# Patient Record
Sex: FEMALE | Race: WHITE | NOT HISPANIC OR LATINO
[De-identification: names, ages, dates, MRNs, and addresses within clinical notes are randomized per-mention and may not be internally consistent; named-entity substitution may affect disease eponyms.]

---

## 2017-05-07 ENCOUNTER — HOSPITAL (OUTPATIENT)
Dept: OTHER | Age: 82
End: 2017-05-07
Attending: INTERNAL MEDICINE

## 2017-08-28 PROBLEM — M81.0 AGE-RELATED OSTEOPOROSIS WITHOUT CURRENT PATHOLOGICAL FRACTURE: Status: ACTIVE | Noted: 2017-08-28

## 2017-08-28 PROBLEM — F41.9 ANXIETY: Status: ACTIVE | Noted: 2017-08-28

## 2017-08-28 PROBLEM — E78.5 HYPERLIPIDEMIA LDL GOAL <130: Status: ACTIVE | Noted: 2017-08-28

## 2017-08-28 PROBLEM — I10 ESSENTIAL HYPERTENSION: Status: ACTIVE | Noted: 2017-08-28

## 2017-08-28 PROBLEM — E03.9 HYPOTHYROIDISM, UNSPECIFIED TYPE: Status: ACTIVE | Noted: 2017-08-28

## 2018-07-21 ENCOUNTER — APPOINTMENT (OUTPATIENT)
Dept: CV DIAGNOSTICS | Facility: HOSPITAL | Age: 83
DRG: 309 | End: 2018-07-21
Attending: NURSE PRACTITIONER
Payer: MEDICARE

## 2018-07-21 ENCOUNTER — APPOINTMENT (OUTPATIENT)
Dept: CT IMAGING | Facility: HOSPITAL | Age: 83
DRG: 309 | End: 2018-07-21
Attending: HOSPITALIST
Payer: MEDICARE

## 2018-07-21 ENCOUNTER — APPOINTMENT (OUTPATIENT)
Dept: GENERAL RADIOLOGY | Facility: HOSPITAL | Age: 83
DRG: 309 | End: 2018-07-21
Attending: EMERGENCY MEDICINE
Payer: MEDICARE

## 2018-07-21 ENCOUNTER — HOSPITAL ENCOUNTER (INPATIENT)
Facility: HOSPITAL | Age: 83
LOS: 2 days | Discharge: HOME | DRG: 309 | End: 2018-07-23
Attending: EMERGENCY MEDICINE | Admitting: HOSPITALIST
Payer: MEDICARE

## 2018-07-21 DIAGNOSIS — S01.01XA LACERATION OF SCALP, INITIAL ENCOUNTER: ICD-10-CM
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
## 2018-07-21 DIAGNOSIS — R00.1 BRADYCARDIA: Primary | ICD-10-CM

## 2018-07-21 LAB
ALBUMIN SERPL-MCNC: 3.2 G/DL (ref 3.5–4.8)
ALBUMIN/GLOB SERPL: 0.9 {RATIO} (ref 1–2)
ALP LIVER SERPL-CCNC: 77 U/L (ref 55–142)
ALT SERPL-CCNC: 20 U/L (ref 14–54)
ANION GAP SERPL CALC-SCNC: 9 MMOL/L (ref 0–18)
AST SERPL-CCNC: 19 U/L (ref 15–41)
ATRIAL RATE: 40 BPM
BASOPHILS # BLD AUTO: 0.05 X10(3) UL (ref 0–0.1)
BASOPHILS NFR BLD AUTO: 0.5 %
BILIRUB SERPL-MCNC: 0.6 MG/DL (ref 0.1–2)
BUN BLD-MCNC: 52 MG/DL (ref 8–20)
BUN/CREAT SERPL: 34.7 (ref 10–20)
CALCIUM BLD-MCNC: 9 MG/DL (ref 8.3–10.3)
CHLORIDE SERPL-SCNC: 114 MMOL/L (ref 101–111)
CO2 SERPL-SCNC: 21 MMOL/L (ref 22–32)
CREAT BLD-MCNC: 1.5 MG/DL (ref 0.55–1.02)
EOSINOPHIL # BLD AUTO: 0.12 X10(3) UL (ref 0–0.3)
EOSINOPHIL NFR BLD AUTO: 1.2 %
ERYTHROCYTE [DISTWIDTH] IN BLOOD BY AUTOMATED COUNT: 13.5 % (ref 11.5–16)
GLOBULIN PLAS-MCNC: 3.6 G/DL (ref 2.5–3.7)
GLUCOSE BLD-MCNC: 101 MG/DL (ref 70–99)
HCT VFR BLD AUTO: 42.9 % (ref 34–50)
HGB BLD-MCNC: 13.7 G/DL (ref 12–16)
IMMATURE GRANULOCYTE COUNT: 0.04 X10(3) UL (ref 0–1)
IMMATURE GRANULOCYTE RATIO %: 0.4 %
LYMPHOCYTES # BLD AUTO: 0.86 X10(3) UL (ref 0.9–4)
LYMPHOCYTES NFR BLD AUTO: 8.3 %
M PROTEIN MFR SERPL ELPH: 6.8 G/DL (ref 6.1–8.3)
MCH RBC QN AUTO: 31.3 PG (ref 27–33.2)
MCHC RBC AUTO-ENTMCNC: 31.9 G/DL (ref 31–37)
MCV RBC AUTO: 97.9 FL (ref 81–100)
MONOCYTES # BLD AUTO: 0.53 X10(3) UL (ref 0.1–1)
MONOCYTES NFR BLD AUTO: 5.1 %
NEUTROPHIL ABS PRELIM: 8.7 X10 (3) UL (ref 1.3–6.7)
NEUTROPHILS # BLD AUTO: 8.7 X10(3) UL (ref 1.3–6.7)
NEUTROPHILS NFR BLD AUTO: 84.5 %
OSMOLALITY SERPL CALC.SUM OF ELEC: 312 MOSM/KG (ref 275–295)
P AXIS: 44 DEGREES
P-R INTERVAL: 226 MS
PLATELET # BLD AUTO: 226 10(3)UL (ref 150–450)
POTASSIUM SERPL-SCNC: 5.1 MMOL/L (ref 3.6–5.1)
Q-T INTERVAL: 506 MS
QRS DURATION: 82 MS
QTC CALCULATION (BEZET): 412 MS
R AXIS: 33 DEGREES
RBC # BLD AUTO: 4.38 X10(6)UL (ref 3.8–5.1)
RED CELL DISTRIBUTION WIDTH-SD: 48.4 FL (ref 35.1–46.3)
SODIUM SERPL-SCNC: 144 MMOL/L (ref 136–144)
T AXIS: 118 DEGREES
TROPONIN I SERPL-MCNC: <0.046 NG/ML (ref ?–0.05)
TSI SER-ACNC: 0.18 MIU/ML (ref 0.35–5.5)
VENTRICULAR RATE: 40 BPM
WBC # BLD AUTO: 10.3 X10(3) UL (ref 4–13)

## 2018-07-21 PROCEDURE — 71045 X-RAY EXAM CHEST 1 VIEW: CPT | Performed by: EMERGENCY MEDICINE

## 2018-07-21 PROCEDURE — 93306 TTE W/DOPPLER COMPLETE: CPT | Performed by: NURSE PRACTITIONER

## 2018-07-21 PROCEDURE — 84484 ASSAY OF TROPONIN QUANT: CPT | Performed by: EMERGENCY MEDICINE

## 2018-07-21 PROCEDURE — 36415 COLL VENOUS BLD VENIPUNCTURE: CPT

## 2018-07-21 PROCEDURE — 12002 RPR S/N/AX/GEN/TRNK2.6-7.5CM: CPT

## 2018-07-21 PROCEDURE — 93005 ELECTROCARDIOGRAM TRACING: CPT

## 2018-07-21 PROCEDURE — 99285 EMERGENCY DEPT VISIT HI MDM: CPT

## 2018-07-21 PROCEDURE — 93010 ELECTROCARDIOGRAM REPORT: CPT

## 2018-07-21 PROCEDURE — 70450 CT HEAD/BRAIN W/O DYE: CPT | Performed by: HOSPITALIST

## 2018-07-21 PROCEDURE — 80053 COMPREHEN METABOLIC PANEL: CPT | Performed by: EMERGENCY MEDICINE

## 2018-07-21 PROCEDURE — 0HQ0XZZ REPAIR SCALP SKIN, EXTERNAL APPROACH: ICD-10-PCS | Performed by: PATHOLOGY

## 2018-07-21 PROCEDURE — 85025 COMPLETE CBC W/AUTO DIFF WBC: CPT | Performed by: EMERGENCY MEDICINE

## 2018-07-21 PROCEDURE — 84443 ASSAY THYROID STIM HORMONE: CPT | Performed by: NURSE PRACTITIONER

## 2018-07-21 RX ORDER — POLYETHYLENE GLYCOL 3350 17 G/17G
17 POWDER, FOR SOLUTION ORAL DAILY PRN
Status: DISCONTINUED | OUTPATIENT
Start: 2018-07-21 | End: 2018-07-23

## 2018-07-21 RX ORDER — LATANOPROST 50 UG/ML
1 SOLUTION/ DROPS OPHTHALMIC NIGHTLY
Status: DISCONTINUED | OUTPATIENT
Start: 2018-07-21 | End: 2018-07-23

## 2018-07-21 RX ORDER — AZELASTINE 1 MG/ML
1 SPRAY, METERED NASAL 2 TIMES DAILY
Status: DISCONTINUED | OUTPATIENT
Start: 2018-07-21 | End: 2018-07-23

## 2018-07-21 RX ORDER — HYDRALAZINE HYDROCHLORIDE 20 MG/ML
5 INJECTION INTRAMUSCULAR; INTRAVENOUS EVERY 4 HOURS PRN
Status: DISCONTINUED | OUTPATIENT
Start: 2018-07-21 | End: 2018-07-23

## 2018-07-21 RX ORDER — ASPIRIN 81 MG/1
81 TABLET, CHEWABLE ORAL DAILY
Status: DISCONTINUED | OUTPATIENT
Start: 2018-07-22 | End: 2018-07-23

## 2018-07-21 RX ORDER — SODIUM CHLORIDE 9 MG/ML
INJECTION, SOLUTION INTRAVENOUS CONTINUOUS
Status: DISCONTINUED | OUTPATIENT
Start: 2018-07-21 | End: 2018-07-22

## 2018-07-21 RX ORDER — AZELASTINE 1 MG/ML
1 SPRAY, METERED NASAL 2 TIMES DAILY
Status: DISCONTINUED | OUTPATIENT
Start: 2018-07-21 | End: 2018-07-21 | Stop reason: RX

## 2018-07-21 RX ORDER — DOCUSATE SODIUM 100 MG/1
100 CAPSULE, LIQUID FILLED ORAL 2 TIMES DAILY
Status: DISCONTINUED | OUTPATIENT
Start: 2018-07-21 | End: 2018-07-23

## 2018-07-21 RX ORDER — METOCLOPRAMIDE HYDROCHLORIDE 5 MG/ML
5 INJECTION INTRAMUSCULAR; INTRAVENOUS EVERY 8 HOURS PRN
Status: DISCONTINUED | OUTPATIENT
Start: 2018-07-21 | End: 2018-07-23

## 2018-07-21 RX ORDER — BISACODYL 10 MG
10 SUPPOSITORY, RECTAL RECTAL
Status: DISCONTINUED | OUTPATIENT
Start: 2018-07-21 | End: 2018-07-23

## 2018-07-21 RX ORDER — ONDANSETRON 2 MG/ML
4 INJECTION INTRAMUSCULAR; INTRAVENOUS EVERY 6 HOURS PRN
Status: DISCONTINUED | OUTPATIENT
Start: 2018-07-21 | End: 2018-07-23

## 2018-07-21 RX ORDER — DORZOLAMIDE HCL 20 MG/ML
1 SOLUTION/ DROPS OPHTHALMIC 2 TIMES DAILY
Status: DISCONTINUED | OUTPATIENT
Start: 2018-07-21 | End: 2018-07-23

## 2018-07-21 RX ORDER — DONEPEZIL HYDROCHLORIDE 10 MG/1
10 TABLET, FILM COATED ORAL NIGHTLY
Status: DISCONTINUED | OUTPATIENT
Start: 2018-07-21 | End: 2018-07-23

## 2018-07-21 RX ORDER — PRAVASTATIN SODIUM 20 MG
20 TABLET ORAL NIGHTLY
Status: DISCONTINUED | OUTPATIENT
Start: 2018-07-21 | End: 2018-07-23

## 2018-07-21 NOTE — PROGRESS NOTES
NURSING ADMISSION NOTE      Patient admitted via Cart  Oriented to room. Safety precautions initiated. Bed in low position. Call light in reach.     Admission database completed  Patient up in chair  Daughter at bedside  Patient A/O x3-4  History of

## 2018-07-21 NOTE — PROGRESS NOTES
Attempted to do 2D echocardiogram at 4:15pm.  PCT in room with patient assesing vitals and caring patient. 2D echocardiogram to be done tomorrow morning.

## 2018-07-21 NOTE — ED PROVIDER NOTES
Patient Seen in: [facility] 3ne-a    History   Patient presents with:  Fall (musculoskeletal, neurologic)    Stated Complaint: fall    HPI    Patient was brought from assisted living facility after patient fell while in the dining room.   Patient nor noted within the wound. Eyes: EOM are normal.   Neck: Normal range of motion. Neck supple. Cardiovascular: Regular rhythm, normal heart sounds and intact distal pulses. Bradycardia present. No murmur heard.   Pulmonary/Chest: Effort normal and breat EKG    Rate, intervals and axes as noted on EKG Report. Rate: 40  Rhythm: Sinus Rhythm  Reading: Nonspecific ST and T-wave abnormalities.            ED Course as of Jul 21 1422  ------------------------------------------------------------  Laceration w

## 2018-07-21 NOTE — PROGRESS NOTES
Cardiology Consultation Note      Ruben Mixon Patient Status:  Inpatient    1929 MRN [id number]   [facility] 3NE-A Attending Mona Powers MD   [facility] Day # 0 PCP Geri Stock May, DO     Reason for Consultation:  -Fall    Impression:  1. weight 132 lb 4.4 oz (60 kg), SpO2 95 %.   Temp (24hrs), Av.7 °F (36.5 °C), Min:97.6 °F (36.4 °C), Max:97.7 °F (36.5 °C)    Wt Readings from Last 3 Encounters:  18 : 132 lb 4.4 oz (60 kg)  18 : 132 lb (59.9 kg)  18 : 129 lb 12.8 oz (58

## 2018-07-21 NOTE — ED INITIAL ASSESSMENT (HPI)
Pt fell while walking back from dining room, wound to back of head,  Pt does not remember falling.   Arrives alert to her norm,  Denies pain

## 2018-07-21 NOTE — PROGRESS NOTES
[facility] Pharmacy Note:  Renal Dose Adjustment for Metoclopramide (REGLAN)    Dara Awad has been prescribed Metoclopramide (REGLAN) 10 mg every 8 hours as needed for NV. Estimated Creatinine Clearance: 20.1 mL/min (A) (based on SCr of 1.5 mg/dL (H)).

## 2018-07-21 NOTE — PROGRESS NOTES
07/21/18 1627 07/21/18 1629 07/21/18 1631   Vitals   BP (!) 177/49 (!) 183/58 (!) 178/65   BP Location Right arm Right arm Right arm   BP Method Automatic Automatic Automatic   Patient Position Lying Sitting Standing     Negative orthostatic blood press

## 2018-07-22 LAB
ANION GAP SERPL CALC-SCNC: 7 MMOL/L (ref 0–18)
BASOPHILS # BLD AUTO: 0.07 X10(3) UL (ref 0–0.1)
BASOPHILS NFR BLD AUTO: 0.8 %
BUN BLD-MCNC: 46 MG/DL (ref 8–20)
BUN/CREAT SERPL: 41.1 (ref 10–20)
CALCIUM BLD-MCNC: 9 MG/DL (ref 8.3–10.3)
CHLORIDE SERPL-SCNC: 115 MMOL/L (ref 101–111)
CO2 SERPL-SCNC: 19 MMOL/L (ref 22–32)
CREAT BLD-MCNC: 1.12 MG/DL (ref 0.55–1.02)
EOSINOPHIL # BLD AUTO: 0.24 X10(3) UL (ref 0–0.3)
EOSINOPHIL NFR BLD AUTO: 2.7 %
ERYTHROCYTE [DISTWIDTH] IN BLOOD BY AUTOMATED COUNT: 13.5 % (ref 11.5–16)
GLUCOSE BLD-MCNC: 90 MG/DL (ref 70–99)
HCT VFR BLD AUTO: 40.2 % (ref 34–50)
HGB BLD-MCNC: 13.4 G/DL (ref 12–16)
IMMATURE GRANULOCYTE COUNT: 0.04 X10(3) UL (ref 0–1)
IMMATURE GRANULOCYTE RATIO %: 0.5 %
LYMPHOCYTES # BLD AUTO: 1.18 X10(3) UL (ref 0.9–4)
LYMPHOCYTES NFR BLD AUTO: 13.5 %
MCH RBC QN AUTO: 31.3 PG (ref 27–33.2)
MCHC RBC AUTO-ENTMCNC: 33.3 G/DL (ref 31–37)
MCV RBC AUTO: 93.9 FL (ref 81–100)
MONOCYTES # BLD AUTO: 0.74 X10(3) UL (ref 0.1–1)
MONOCYTES NFR BLD AUTO: 8.5 %
NEUTROPHIL ABS PRELIM: 6.48 X10 (3) UL (ref 1.3–6.7)
NEUTROPHILS # BLD AUTO: 6.48 X10(3) UL (ref 1.3–6.7)
NEUTROPHILS NFR BLD AUTO: 74 %
OSMOLALITY SERPL CALC.SUM OF ELEC: 303 MOSM/KG (ref 275–295)
PLATELET # BLD AUTO: 226 10(3)UL (ref 150–450)
POTASSIUM SERPL-SCNC: 4.3 MMOL/L (ref 3.6–5.1)
RBC # BLD AUTO: 4.28 X10(6)UL (ref 3.8–5.1)
RED CELL DISTRIBUTION WIDTH-SD: 46.8 FL (ref 35.1–46.3)
SODIUM SERPL-SCNC: 141 MMOL/L (ref 136–144)
WBC # BLD AUTO: 8.8 X10(3) UL (ref 4–13)

## 2018-07-22 PROCEDURE — 85025 COMPLETE CBC W/AUTO DIFF WBC: CPT | Performed by: HOSPITALIST

## 2018-07-22 PROCEDURE — 80048 BASIC METABOLIC PNL TOTAL CA: CPT | Performed by: HOSPITALIST

## 2018-07-22 RX ORDER — LEVOTHYROXINE SODIUM 0.15 MG/1
150 TABLET ORAL
Status: DISCONTINUED | OUTPATIENT
Start: 2018-07-22 | End: 2018-07-23

## 2018-07-22 RX ORDER — HEPARIN SODIUM 5000 [USP'U]/ML
5000 INJECTION, SOLUTION INTRAVENOUS; SUBCUTANEOUS EVERY 8 HOURS SCHEDULED
Status: DISCONTINUED | OUTPATIENT
Start: 2018-07-22 | End: 2018-07-23

## 2018-07-22 NOTE — PLAN OF CARE
Patient is A&Ox1-2  Denies any pain or discomfort  No n/v/d  SB on tele; on room air  Posterior head laceration with suture noted   Dressing changed  Up with a walker  Afebrile, VSS  IVF infusing  Will continue to monitor        CARDIOVASCULAR - ADULT    •

## 2018-07-22 NOTE — PROGRESS NOTES
[facility] Hospitalist Progress Note                                                                   [place] [address]  1/28/1929    Cc: f/u bradycardia    SUBJECTIVE:    Pt denies CP/SOB, N/V, LH/Di intracranial bleeding  -EKG with sinus bradycardia  -Orthostatics negative  -TTE  -Telemetry  -Cardiology consulted, appreciate  -Fall precautions  -PT/OT     Sinus bradycardia  -Holding BB  -Cardiology following     HTN  -holding BB as above  -PRN hydrala

## 2018-07-22 NOTE — PLAN OF CARE
CARDIOVASCULAR - ADULT    • Absence of cardiac arrhythmias or at baseline Progressing        HEMATOLOGIC - ADULT    • Maintains hematologic stability Progressing        MUSCULOSKELETAL - ADULT    • Return mobility to safest level of function Progressing

## 2018-07-22 NOTE — PROGRESS NOTES
Cardiology Consultation Note      Hailey Mercury Worst Patient Status:  Inpatient    1929 MRN [id number]   [facility] 3NE-A Attending Francisco Javier Pandya MD   [facility] Day # 1 PCP Adan Sykes DO   Outpatient cardiologist: None  Reason for initial co Medical History:   Diagnosis Date   • Anxiety    • Essential hypertension    • Glaucoma    • Hyperlipidemia    • Hypothyroid    • Mitral valve prolapse, nonrheumatic    • Osteoporosis        Family History   Problem Relation Age of Onset   • Cancer Mother Date   WBC 8.8 07/22/2018   HGB 13.4 07/22/2018   HCT 40.2 07/22/2018   .0 07/22/2018   CREATSERUM 1.12 07/22/2018   BUN 46 07/22/2018    07/22/2018   K 4.3 07/22/2018    07/22/2018   CO2 19.0 07/22/2018   GLU 90 07/22/2018   CA 9.0 07/2

## 2018-07-22 NOTE — H&P
[facility] Hospitalist History and Physical      Patient presents with:  Fall (musculoskeletal, neurologic)       PCP: Edson Sykes DO      History of Present Illness: Patient is a 80year old female with PMH sig for HTN, HL, Hypothyroid, glaucoma, anxiety who pr Lab Results  Component Value Date   WBC 10.3 07/21/2018   HGB 13.7 07/21/2018   HCT 42.9 07/21/2018   .0 07/21/2018   CREATSERUM 1.50 07/21/2018   BUN 52 07/21/2018    07/21/2018   K 5.1 07/21/2018    07/21/2018   CO2 21.0 07/21/2018 further evaluation. 3. Small contusion to the right occipital scalp.     Dictated by: Gary Au MD on 7/21/2018 at 18:08     Approved by: Gary Au MD            Xr Chest Ap Portable  (cpt=71045)    Result Date: 7/21/2018  PROCEDURE:  XR BARAK Certification    Patient will require inpatient services that will reasonably be expected to span two midnight's based on the clinical documentation in H+P.    Based on patients current state of illness, I anticipate that, after discharge, patient will requ

## 2018-07-23 VITALS
TEMPERATURE: 98 F | RESPIRATION RATE: 16 BRPM | WEIGHT: 132.25 LBS | OXYGEN SATURATION: 97 % | DIASTOLIC BLOOD PRESSURE: 68 MMHG | HEART RATE: 52 BPM | SYSTOLIC BLOOD PRESSURE: 181 MMHG | BODY MASS INDEX: 24 KG/M2

## 2018-07-23 LAB
ANION GAP SERPL CALC-SCNC: 9 MMOL/L (ref 0–18)
BUN BLD-MCNC: 36 MG/DL (ref 8–20)
BUN/CREAT SERPL: 30.8 (ref 10–20)
CALCIUM BLD-MCNC: 8.8 MG/DL (ref 8.3–10.3)
CHLORIDE SERPL-SCNC: 112 MMOL/L (ref 101–111)
CO2 SERPL-SCNC: 19 MMOL/L (ref 22–32)
CREAT BLD-MCNC: 1.17 MG/DL (ref 0.55–1.02)
GLUCOSE BLD-MCNC: 95 MG/DL (ref 70–99)
HAV IGM SER QL: 2 MG/DL (ref 1.8–2.5)
OSMOLALITY SERPL CALC.SUM OF ELEC: 298 MOSM/KG (ref 275–295)
POTASSIUM SERPL-SCNC: 4.1 MMOL/L (ref 3.6–5.1)
SODIUM SERPL-SCNC: 140 MMOL/L (ref 136–144)

## 2018-07-23 PROCEDURE — 80048 BASIC METABOLIC PNL TOTAL CA: CPT | Performed by: NURSE PRACTITIONER

## 2018-07-23 PROCEDURE — 97530 THERAPEUTIC ACTIVITIES: CPT

## 2018-07-23 PROCEDURE — 97166 OT EVAL MOD COMPLEX 45 MIN: CPT

## 2018-07-23 PROCEDURE — 83735 ASSAY OF MAGNESIUM: CPT | Performed by: NURSE PRACTITIONER

## 2018-07-23 PROCEDURE — 97161 PT EVAL LOW COMPLEX 20 MIN: CPT

## 2018-07-23 RX ORDER — LEVOTHYROXINE SODIUM 0.15 MG/1
150 TABLET ORAL
Qty: 30 TABLET | Refills: 0 | Status: SHIPPED | OUTPATIENT
Start: 2018-07-24

## 2018-07-23 RX ORDER — AMLODIPINE BESYLATE 5 MG/1
5 TABLET ORAL DAILY
Qty: 30 TABLET | Refills: 0 | Status: SHIPPED | OUTPATIENT
Start: 2018-07-24 | End: 2018-07-23

## 2018-07-23 RX ORDER — AMLODIPINE BESYLATE 5 MG/1
5 TABLET ORAL DAILY
Status: DISCONTINUED | OUTPATIENT
Start: 2018-07-23 | End: 2018-07-23

## 2018-07-23 RX ORDER — AMLODIPINE BESYLATE 5 MG/1
5 TABLET ORAL DAILY
Qty: 30 TABLET | Refills: 0 | Status: SHIPPED | OUTPATIENT
Start: 2018-07-24

## 2018-07-23 NOTE — HOME CARE LIAISON
TNL REC'D PHONE REFERRAL TO SEE PTNT AND OFFER HH. TNL MET WITH PTNT AND FAMILY TO DISCUSS HH. FAMILY ASKED THAT I WAIT TO HEAR FROM POA TO FINALIZE THE [address].  TNL LEFT BROCHURE AND MY CONTACT NUMBER FOR POA    THANKS   David Enrique

## 2018-07-23 NOTE — PHYSICAL THERAPY NOTE
PHYSICAL THERAPY EVALUATION - INPATIENT     Room Number: 3912/0315-S  Evaluation Date: 7/23/2018  Type of Evaluation: Initial  Physician Order: PT Eval and Treat    Presenting Problem: s/p fall, bradycardia  Reason for Therapy: Mobility Dysfunction a OF MOTION AND STRENGTH ASSESSMENT  Upper extremity ROM and strength are within functional limits see OT evaluation    Lower extremity ROM is within functional limits     Lower extremity strength is within functional limits Right Hip flexion  3+/5  Left Hip tested  Comment : above per FIM    Skilled Therapy Provided: Patient received in supine with family present. Transfers to EOB with supervision for safety. Patient sat at [facility] with supervision for safety.   Transfers to stand with cga for balance/safety gai 5x/week  Number of Visits to Meet Established Goals: 4      CURRENT GOALS    Goal #1 Patient is able to demonstrate transfers Sit to/from Stand at assistance level: supervision  To rw   Goal #3 Patient is able to ambulate 150 feet with assist device: walke

## 2018-07-23 NOTE — CM/SW NOTE
MSW spoke to lauren Nyla Emmanuel at [address] [place] [address].  She states she would like call for report.  [phone]-ask for Zan Tavares

## 2018-07-23 NOTE — PROGRESS NOTES
Dr. Deshawn Haney paged due to patient having elevated /68 with a heart rate of 52. Coreg still held due to low heart rates. Hydralaziine IVP PRN available per [facility] but requested if patient might be able to be started on an oral medication scheduled.  Mirza

## 2018-07-23 NOTE — PLAN OF CARE
A/O x person, place (Confused at baseline d/t dementia)  VSS. Room air. . SB/NSR on tele  Dressing to posterior head reinforced, C/D/I  SCDs on.   On heparin for DVT prophylaxis  Electrolyte protocol  Briefed & incontinent at times  Up to bathroom w/ wa

## 2018-07-23 NOTE — PROGRESS NOTES
[facility] Cardiology Progress Note        Loreta Imelda Worst Patient Status:  Inpatient    1929 MRN [id number]   [facility] 3NE-A Attending Hattie Manzano MD   [facility] Day # 2 PCP Queeine Bhat May, DO     Subjective:  No iss 4.3  4.1   CL  114*  115*  112*   CO2  21.0*  19.0*  19.0*   BUN  52*  46*  36*   CREATSERUM  1.50*  1.12*  1.17*   CA  9.0  9.0  8.8   MG   --    --   2.0   GLU  101*  90  95       Recent Labs   Lab  07/21/18   1156   ALT  20   AST  19   ALB  3.2*       R

## 2018-07-23 NOTE — CM/SW NOTE
07/23/18 1000   CM/SW Referral Data   Referral Source Physician;Social Work (self-referral)   Reason for Referral Discharge planning   Informant Patient; Children   Pertinent Medical Hx   Primary Care Physician Name May   Patient Info   Patient's Mental

## 2018-07-23 NOTE — OCCUPATIONAL THERAPY NOTE
OCCUPATIONAL THERAPY EVALUATION - INPATIENT     Room Number: 2938/4082-F  Evaluation Date: 7/23/2018  Type of Evaluation: Initial  Presenting Problem: bradycardia    Physician Order: IP Consult to Occupational Therapy  Reason for Therapy: ADL/IADL Dysfunct dining room for meals. Staff assists with medication management and unclear if they do provide any assist with bathing or dressing. Pt does go to exercise class and activities that are offered at the facility.      SUBJECTIVE   Pt stated, \"My memory is a ‘6-Clicks’ Inpatient Daily Activity Short Form  How much help from another person does the patient currently need…  -   Putting on and taking off regular lower body clothing?: A Little  -   Bathing (including washing, rinsing, drying)?: A Little  -   Toile toileting, and func'l mobility. No LOB noted, but pt did need cues to adjust posture. Pt voicing some R knee pain which family confirmed she had PTA. Pt oriented to self and place with priming.   The patient is functioning below her previous functional le

## 2018-07-23 NOTE — PROGRESS NOTES
NURSING DISCHARGE NOTE    Discharged Nursing home via Wheelchair. Accompanied by Family member and Support staff  Belongings Taken by patient/family. IV site discontinued.  Discharge instructions, follow up, and medications discussed with patient, h

## 2018-08-28 NOTE — DISCHARGE SUMMARY
General Medicine Discharge Summary     Patient ID:  Liv Mixon  80year old  1/28/1929    Admit date: 7/21/2018    Discharge date and time: 7/23/2018  4:30 PM     Attending Physician: No att. providers course:    80year old female with PMH sig for HTN, HL, Hypothyroid, glaucoma, anxiety who presented to [facility] after fall and hitting her head.     Fall/syncope  -CT head without acute intracranial bleeding  -EKG with sinus bradycardia  -Orthostatics negat Historical    DORZOLAMIDE HCL OP  Apply 1 drop to eye 2 (two) times daily. 1 drop each eye , Historical    LATANOPROST OP  Apply 1 drop to eye nightly.  1 drop each eye , Historical    Acetaminophen (TYLENOL OR)  Take by mouth., Historical      STOP taking

## 2018-10-10 ENCOUNTER — NURSE ONLY (OUTPATIENT)
Dept: LAB | Age: 83
End: 2018-10-10
Attending: FAMILY MEDICINE
Payer: MEDICARE

## 2018-10-10 DIAGNOSIS — E03.9 ACQUIRED HYPOTHYROIDISM: ICD-10-CM

## 2018-10-10 PROCEDURE — 36415 COLL VENOUS BLD VENIPUNCTURE: CPT

## 2018-10-10 PROCEDURE — 84439 ASSAY OF FREE THYROXINE: CPT

## 2018-10-10 PROCEDURE — 84443 ASSAY THYROID STIM HORMONE: CPT

## 2019-04-19 ENCOUNTER — HOSPITAL ENCOUNTER (EMERGENCY)
Facility: HOSPITAL | Age: 84
Discharge: HOME | End: 2019-04-19
Attending: EMERGENCY MEDICINE
Payer: MEDICARE

## 2019-04-19 ENCOUNTER — APPOINTMENT (OUTPATIENT)
Dept: GENERAL RADIOLOGY | Facility: HOSPITAL | Age: 84
End: 2019-04-19
Payer: MEDICARE

## 2019-04-19 VITALS
WEIGHT: 140 LBS | HEIGHT: 59 IN | SYSTOLIC BLOOD PRESSURE: 177 MMHG | RESPIRATION RATE: 22 BRPM | OXYGEN SATURATION: 96 % | DIASTOLIC BLOOD PRESSURE: 59 MMHG | BODY MASS INDEX: 28.22 KG/M2 | HEART RATE: 45 BPM | TEMPERATURE: 97 F

## 2019-04-19 DIAGNOSIS — J40 BRONCHITIS: Primary | ICD-10-CM

## 2019-04-19 PROCEDURE — 94640 AIRWAY INHALATION TREATMENT: CPT

## 2019-04-19 PROCEDURE — 36415 COLL VENOUS BLD VENIPUNCTURE: CPT | Performed by: EMERGENCY MEDICINE

## 2019-04-19 PROCEDURE — 81003 URINALYSIS AUTO W/O SCOPE: CPT | Performed by: EMERGENCY MEDICINE

## 2019-04-19 PROCEDURE — 94664 DEMO&/EVAL PT USE INHALER: CPT

## 2019-04-19 PROCEDURE — 99285 EMERGENCY DEPT VISIT HI MDM: CPT | Performed by: EMERGENCY MEDICINE

## 2019-04-19 PROCEDURE — 83880 ASSAY OF NATRIURETIC PEPTIDE: CPT | Performed by: EMERGENCY MEDICINE

## 2019-04-19 PROCEDURE — 93005 ELECTROCARDIOGRAM TRACING: CPT

## 2019-04-19 PROCEDURE — 71045 X-RAY EXAM CHEST 1 VIEW: CPT | Performed by: EMERGENCY MEDICINE

## 2019-04-19 PROCEDURE — 93010 ELECTROCARDIOGRAM REPORT: CPT | Performed by: EMERGENCY MEDICINE

## 2019-04-19 PROCEDURE — 80053 COMPREHEN METABOLIC PANEL: CPT | Performed by: EMERGENCY MEDICINE

## 2019-04-19 PROCEDURE — 85025 COMPLETE CBC W/AUTO DIFF WBC: CPT | Performed by: EMERGENCY MEDICINE

## 2019-04-19 RX ORDER — ALBUTEROL SULFATE 90 UG/1
2 AEROSOL, METERED RESPIRATORY (INHALATION) EVERY 4 HOURS PRN
Qty: 1 INHALER | Refills: 0 | Status: SHIPPED | OUTPATIENT
Start: 2019-04-19 | End: 2019-05-19

## 2019-04-19 RX ORDER — AZITHROMYCIN 250 MG/1
TABLET, FILM COATED ORAL
Qty: 1 PACKAGE | Refills: 0 | Status: SHIPPED | OUTPATIENT
Start: 2019-04-19

## 2019-04-19 RX ORDER — IPRATROPIUM BROMIDE AND ALBUTEROL SULFATE 2.5; .5 MG/3ML; MG/3ML
3 SOLUTION RESPIRATORY (INHALATION) ONCE
Status: COMPLETED | OUTPATIENT
Start: 2019-04-19 | End: 2019-04-19

## 2019-04-19 NOTE — ED PROVIDER NOTES
Patient Seen in: [facility] Emergency Department    History   Patient presents with:  Dyspnea SUJAHTA SOB (respiratory)    Stated Complaint: SUJATHA    HPI    63-year-old female presents with shortness of breath for last few days.   Has had some wheezing and no JVD no lymphadenopathy no meningismus no carotid bruit  CV: Regular rate and rhythm no murmur rub  Respiratory: Patient has expiratory wheezes noted, some crackles in the right base  Abdomen: Soft nontender nondistended, no rebound no guarding  no hepat Will give her a Z-Jose A she can start in a day or 2 if she is not feeling any better. Also will give an inhaler. Patient does not appear septic and appears very well mentation at this point.   No sign of any UTI, BNP is slightly elevated but at 89 years o

## 2020-06-03 ENCOUNTER — NURSE ONLY (OUTPATIENT)
Dept: LAB | Age: 85
End: 2020-06-03
Attending: FAMILY MEDICINE
Payer: MEDICARE

## 2020-06-03 DIAGNOSIS — I25.10 CAD (CORONARY ARTERY DISEASE): Primary | ICD-10-CM

## 2020-06-03 DIAGNOSIS — E78.5 HYPERLIPIDEMIA: ICD-10-CM

## 2020-06-03 DIAGNOSIS — E03.9 HYPOTHYROIDISM: ICD-10-CM

## 2020-06-03 PROCEDURE — 80061 LIPID PANEL: CPT

## 2020-06-03 PROCEDURE — 80053 COMPREHEN METABOLIC PANEL: CPT

## 2020-06-03 PROCEDURE — 85025 COMPLETE CBC W/AUTO DIFF WBC: CPT

## 2020-06-03 PROCEDURE — 36415 COLL VENOUS BLD VENIPUNCTURE: CPT

## 2020-06-03 PROCEDURE — 84443 ASSAY THYROID STIM HORMONE: CPT

## 2020-08-05 ENCOUNTER — NURSE ONLY (OUTPATIENT)
Dept: LAB | Age: 85
End: 2020-08-05
Attending: FAMILY MEDICINE
Payer: MEDICARE

## 2020-08-05 DIAGNOSIS — E03.9 HYPOTHYROIDISM: Primary | ICD-10-CM

## 2020-08-05 LAB — TSI SER-ACNC: 1.49 MIU/ML (ref 0.36–3.74)

## 2020-08-05 PROCEDURE — 36415 COLL VENOUS BLD VENIPUNCTURE: CPT

## 2020-08-05 PROCEDURE — 84443 ASSAY THYROID STIM HORMONE: CPT

## 2020-11-11 ENCOUNTER — NURSE ONLY (OUTPATIENT)
Dept: LAB | Age: 85
End: 2020-11-11
Attending: FAMILY MEDICINE
Payer: MEDICARE

## 2020-11-11 DIAGNOSIS — I10 HTN (HYPERTENSION): ICD-10-CM

## 2020-11-11 DIAGNOSIS — E78.5 DYSLIPIDEMIA: ICD-10-CM

## 2020-11-11 DIAGNOSIS — E03.9 HYPOTHYROID: Primary | ICD-10-CM

## 2020-11-11 DIAGNOSIS — F03.90 DEMENTIA (HCC): ICD-10-CM

## 2020-11-11 PROCEDURE — 80053 COMPREHEN METABOLIC PANEL: CPT

## 2020-11-11 PROCEDURE — 85025 COMPLETE CBC W/AUTO DIFF WBC: CPT

## 2020-11-11 PROCEDURE — 80061 LIPID PANEL: CPT

## 2020-11-11 PROCEDURE — 84443 ASSAY THYROID STIM HORMONE: CPT

## 2020-11-11 PROCEDURE — 36415 COLL VENOUS BLD VENIPUNCTURE: CPT

## 2020-12-30 ENCOUNTER — NURSE ONLY (OUTPATIENT)
Dept: LAB | Age: 85
End: 2020-12-30
Attending: FAMILY MEDICINE
Payer: MEDICARE

## 2020-12-30 DIAGNOSIS — E03.9 HYPOTHYROID: Primary | ICD-10-CM

## 2020-12-30 LAB — TSI SER-ACNC: 4.18 MIU/ML (ref 0.36–3.74)

## 2020-12-30 PROCEDURE — 84443 ASSAY THYROID STIM HORMONE: CPT

## 2020-12-30 PROCEDURE — 36415 COLL VENOUS BLD VENIPUNCTURE: CPT

## 2021-03-03 ENCOUNTER — NURSE ONLY (OUTPATIENT)
Dept: LAB | Age: 86
End: 2021-03-03
Attending: FAMILY MEDICINE
Payer: MEDICARE

## 2021-03-03 DIAGNOSIS — R69 DIAGNOSIS UNKNOWN: Primary | ICD-10-CM

## 2021-03-03 LAB — TSI SER-ACNC: 9.38 MIU/ML (ref 0.36–3.74)

## 2021-03-03 PROCEDURE — 36415 COLL VENOUS BLD VENIPUNCTURE: CPT

## 2021-03-03 PROCEDURE — 84443 ASSAY THYROID STIM HORMONE: CPT

## 2021-04-14 ENCOUNTER — NURSE ONLY (OUTPATIENT)
Dept: LAB | Age: 86
End: 2021-04-14
Attending: FAMILY MEDICINE
Payer: MEDICARE

## 2021-04-14 DIAGNOSIS — E03.9 HYPOTHYROID: Primary | ICD-10-CM

## 2021-04-14 DIAGNOSIS — F03.90 DEMENTIA (HCC): ICD-10-CM

## 2021-04-14 PROCEDURE — 84443 ASSAY THYROID STIM HORMONE: CPT

## 2021-04-14 PROCEDURE — 36415 COLL VENOUS BLD VENIPUNCTURE: CPT

## (undated) NOTE — IP AVS SNAPSHOT
Patient Demographics     Address  [address] [place] [address] # [place] [address] Phone  [phone] [facility])  [phone] (Work)  [phone] (Mobile) *Preferred* E-mail Address  [email or web address]      Emergency Contact(s)     Name Relation Home Work Mobile Jana Moralez MD         RESTASIS OP      Apply to eye 2 (two) times daily as needed. Sertraline HCl 50 MG Tabs  Commonly known as:  ZOLOFT  Next dose due:  7/24 morning      Take 1 tablet (50 mg total) by mouth daily.    Sandra Paniagua May, DO Order ID Medication Name Action Time Action Reason Comments    [id number] latanoprost (XALATAN) 0.005 % ophthalmic solution 1 drop 07/22/18 2128 Given            LEFT LOWER ABDOMEN     Order ID Medication Name Action Time Action Reason Comments    [id number] eGFR calculated by the CKD-EPI equation.    Calcium, Total 8.8 8.3 - 10.3 mg/dL [facility]   Sodium 140 136 - 144 mmol/L — [facility]   Potassium 4.1 3.6 - 5.1 mmol/L [facility]   Chloride 112 101 - 111 mmol/L [facility]   CO2 19.0 22.0 - 32.0 mmol/L No date: CATARACT  No date:      ALL:  No Known Allergies     Home meds: See EMR       Smoking status: Never Smoker    Smokeless tobacco: Never Used    Alcohol use Yes  0.6 oz/week    1 Glasses of wine per week             Fam Hx  Family History   Prob Registry) which includes the Dose Index Registry. PATIENT STATED HISTORY: (As transcribed by Technologist)  Patient fell today and has laceration to occipital region of head. FINDINGS:  There is mild to moderate diffuse volume loss.   There is mild to m Dictated by: Malik Dover MD on 7/21/2018 at 12:23     Approved by: Malik Dover MD               Assessment/Plan:     80year old female with PMH sig for[NB.1] HTN, HL, Hypothyroid, glaucoma, anxiety who presented to [facility] after fall and hitting her head. Filed:  7/23/2018 12:59 PM Date of Service:  7/23/2018 10:15 AM Status:  Signed    :  Jeanne Linda PT (Physical Therapist)             PHYSICAL THERAPY EVALUATION - INPATIENT     Room Number: 4262/3411-V  Evaluation Date: 7/23/2018  Type of Ev · Following Commands:  follows one step commands without difficulty  · Safety Judgement:  decreased awareness of need for assistance and decreased awareness of need for safety    RANGE OF MOTION AND STRENGTH ASSESSMENT  Upper extremity ROM and strength are Gait Assistance: Maximum assistance (FIM due ot distance)  Distance (ft): 65  Assistive Device: Rolling walker  Pattern: R Decreased stance time (decresed step length)  Stoop/Curb Assistance: Not tested  Comment : above per FIM    Skilled Therapy Provided: PLAN  PT Treatment Plan: Bed mobility; Endurance; Energy conservation;Patient education; Family education;Gait training;Transfer training;Balance training  Rehab Potential : Fair  Frequency (Obs): 5x/week  Number of Visits to Meet Established Goals: 4      CU 3. Small contusion to the right occipital scalp.     Problem List  Principal Problem:    Bradycardia  Active Problems:    Laceration of scalp, initial encounter    Fall, initial encounter      Past Medical History  Past Medical History:   Diagnosis Date   • Safety Judgement:  decreased awareness of need for assistance and decreased awareness of need for safety  Awareness of Errors:  assistance required to identify errors made, assistance required to correct errors made and decreased awareness of errors   Awar Explained the role of OT while in the hospital. Pt able to sit EOB to participate in UE assessment and donning role. Pt stood x 1 to amb in the room and sharma with RW. Cues to adjust posture provided.   Assisted pt with orientation and pt able to t/f to th Client Assessment/Performance Deficits MODERATE - Comorbidities and min to mod modifications of tasks    Clinical Decision Making MODERATE - Analysis of occupational profile, detailed assessments, several treatment options    Overall Complexity MODERATE

## (undated) NOTE — ED AVS SNAPSHOT
Sherri Aviles   MRN: [id number]    Department:  [facility] Emergency Department   Date of Visit:  4/19/2019           Disclosure     Insurance plans vary and the physician(s) referred by the ER may not be covered by your plan.  Please contact your tell this physician (or your personal doctor if your instructions are to return to your personal doctor) about any new or lasting problems. The primary care or specialist physician will see patients referred from the [facility] Emergency Department.  Cheryle Levins

## (undated) NOTE — IP AVS SNAPSHOT
[address]/[facility] Services            (For Outpatient Use Only) Initial Admit Date: 7/21/2018   Inpt/Obs Admit Date: Inpt: 7/21/18 / Obs: N/A   Discharge Date:    Jose L Brumfield:  [de-identified]   MRN: [de-identified]   CSN: [id number]        ENCOUNTER  Patient Subscriber ID:  Pt Rel to Subscriber:    Hospital Account Financial Class: Medicare    July 23, 2018

## (undated) NOTE — MR AVS SNAPSHOT
After Visit Summary   10/10/2018    Sherri Aviles    MRN: [id number]           Visit Information     Date & Time  10/10/2018 11:15 AM Provider   [facility],[address] Floor Reference Lab Dept.  Phone  [phone]      Allergies as of 10 DO YOU KNOW WHERE TO GO? Injury & illness are never convenient. If you are dealing with a   non-emergency, consider your options before heading to an ER.          SAME DAY  APPOINTMENTS  Available at primary care offices      AFTER HOURS CARE  Ashely Arnett